# Patient Record
(demographics unavailable — no encounter records)

---

## 2025-06-26 NOTE — HISTORY OF PRESENT ILLNESS
[Patient reported PAP Smear was normal] : Patient reported PAP Smear was normal [Y] : Patient uses contraception [Oral Contraceptive] : uses oral contraception pills [N] : Patient is not sexually active [Normal Amount/Duration] :  normal amount and duration [Frequency: Q ___ days] : menstrual periods occur approximately every [unfilled] days [PapSmeardate] : 05/20/202 [LMPDate] : 06/23/2025 [MensesFreq] : 30 [MensesLength] : 4 [FreeTextEntry1] : 06/23/2025

## 2025-06-26 NOTE — PHYSICAL EXAM
[Chaperoned Physical Exam] : A chaperone was present in the examining room during all aspects of the physical examination. [MA] : MA [Appropriately responsive] : appropriately responsive [Alert] : alert [No Acute Distress] : no acute distress [Soft] : soft [Non-tender] : non-tender [Non-distended] : non-distended [No Lesions] : no lesions [No Mass] : no mass [FreeTextEntry3] : nontender thyroid  [Examination Of The Breasts] : a normal appearance [No Masses] : no breast masses were palpable [Labia Majora] : normal [Labia Minora] : normal [Discharge] : a  ~M vaginal discharge was present [Moderate] : moderate [White] : white [Thick] : thick [No Bleeding] : There was no active vaginal bleeding [Normal] : normal [Uterine Adnexae] : non-palpable [FreeTextEntry2] : white, thick discharge noted across labia minora [FreeTextEntry8] : Nontender, no CMT, no adnexal masses or fullness appreciated.

## 2025-06-26 NOTE — PLAN
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  - Vitals reviewed and within normal limits.  - Breast exam, pelvic exam and pap smear performed today.  -  Findings consistent with yeast vaginitis - prescription sent to pharmacy.  - Patient preventative health actions reviewed-  encouraged well balanced diet and weight bearing exercise.    Return to the office pending results, as needed for GYN concerns and in 1 year for annual follow up. Plan of care discussed with patient who has no additional questions and is in agreement.

## 2025-06-26 NOTE — HISTORY OF PRESENT ILLNESS
The patient is requesting refills but is unable to tell me which rx need to be sent to pharmacy. She said they are for BP but still unable to tell me, possibly. Please reconfirm information       losartan-hydrochlorothiazide (HYZAAR) 100-12.5 MG per tablet     [facility]/pharmacy #[id number]- [place], [place] - [address] [phone] - F [phone] [Patient reported PAP Smear was normal] : Patient reported PAP Smear was normal [Y] : Patient uses contraception [Oral Contraceptive] : uses oral contraception pills [N] : Patient is not sexually active [Normal Amount/Duration] :  normal amount and duration [Frequency: Q ___ days] : menstrual periods occur approximately every [unfilled] days [PapSmeardate] : 05/20/202 [LMPDate] : 06/23/2025 [MensesFreq] : 30 [MensesLength] : 4 [FreeTextEntry1] : 06/23/2025